# Patient Record
Sex: MALE | Race: WHITE | NOT HISPANIC OR LATINO | Employment: FULL TIME | URBAN - METROPOLITAN AREA
[De-identification: names, ages, dates, MRNs, and addresses within clinical notes are randomized per-mention and may not be internally consistent; named-entity substitution may affect disease eponyms.]

---

## 2017-12-28 ENCOUNTER — TRANSCRIBE ORDERS (OUTPATIENT)
Dept: PHYSICAL THERAPY | Facility: CLINIC | Age: 57
End: 2017-12-28

## 2017-12-28 DIAGNOSIS — S86.091A OTHER SPECIFIED INJURY OF RIGHT ACHILLES TENDON, INITIAL ENCOUNTER: Primary | ICD-10-CM

## 2017-12-28 DIAGNOSIS — Z47.89 ORTHOPEDIC AFTERCARE: ICD-10-CM

## 2018-01-02 ENCOUNTER — TREATMENT (OUTPATIENT)
Dept: PHYSICAL THERAPY | Facility: CLINIC | Age: 58
End: 2018-01-02

## 2018-01-02 DIAGNOSIS — M76.61 ACHILLES TENDINITIS OF RIGHT LOWER EXTREMITY: Primary | ICD-10-CM

## 2018-01-02 DIAGNOSIS — S86.011A ACHILLES RUPTURE, RIGHT, INITIAL ENCOUNTER: ICD-10-CM

## 2018-01-02 DIAGNOSIS — R26.2 DIFFICULTY WALKING: ICD-10-CM

## 2018-01-02 PROCEDURE — 97035 APP MDLTY 1+ULTRASOUND EA 15: CPT | Performed by: PHYSICAL THERAPIST

## 2018-01-02 PROCEDURE — 97014 ELECTRIC STIMULATION THERAPY: CPT | Performed by: PHYSICAL THERAPIST

## 2018-01-02 PROCEDURE — 97110 THERAPEUTIC EXERCISES: CPT | Performed by: PHYSICAL THERAPIST

## 2018-01-02 PROCEDURE — 97161 PT EVAL LOW COMPLEX 20 MIN: CPT | Performed by: PHYSICAL THERAPIST

## 2018-01-02 PROCEDURE — 97140 MANUAL THERAPY 1/> REGIONS: CPT | Performed by: PHYSICAL THERAPIST

## 2018-01-02 NOTE — PROGRESS NOTES
Physical Therapy Initial Evaluation and Plan of Care        Subjective Evaluation    History of Present Illness  Onset date: 2017.  Mechanism of injury: During a home move, he was going up and down ramps with boxes. Strained the heal area, started noticing pain.  He came under the care of Dr Villar in 2017, MRI performed, partial rupture of the right achilles tendon.  He was booted 4 weeks, then casted 3 weeks, then booted 6 weeks.  He has been out of the boot about 1 month.  Has been doing PT at [facility], 8 times. Feels it was helping.    Complaints:  The right achilles/ankle is very stiff in morning and after prolong sitting. Still has some discomfort in the right achilles.  No paraesthesia or anaesthesia. Feels better in his work boot.    MEDICATION: 81 mg Asprin daily      Patient Occupation: , [employer]  Current pain ratin  At best pain ratin  At worst pain ratin    Hand dominance: right    Diagnostic Tests  MRI studies: abnormal (Partial Achilles tendon rupture.)             Objective       Static Posture     Ankle/Foot   Ankle/Foot (Left): Pes cavus.   Ankle/Foot (Right): Pes cavus.     Observations     Right Ankle/Foot   Positive for deformity.     Additional Observation Details  Large knot on the achilles tendon about 2 cm proximal to the calcaneous     Tenderness     Additional Tenderness Details  Right achilles tendon mid substance tender.    Neurological Testing   Sensation     Ankle/Foot     Right Ankle/Foot   Intact: light touch, pin prick and sharp/dull discrimination     Passive Range of Motion     Right Ankle/Foot    Dorsiflexion (kf): 2 degrees    Plantar flexion: 55 degrees   Inversion: 22 degrees   Eversion: 24 degrees     Strength/Myotome Testing     Right Ankle/Foot   Dorsiflexion: 3+  Plantar flexion: 5  Inversion: 5  Eversion: 5  Great toe flexion: 5  Great toe extension: 5    Swelling   Left Ankle/Foot   Malleoli: 24 cm    Right Ankle/Foot   Malleoli: 26  cm    Ambulation   Weight-Bearing Status   Assistive device used: none    Ambulation: Level Surfaces   Ambulation without assistive device: independent    Ambulation: Stairs   Ascend stairs: independent  Descend stairs: independent    Observational Gait   Walking speed within functional limits. Decreased stride length, left step length and right step length.   Left foot contact pattern: toe to heel  Right foot contact pattern: toe to heel         Assessment & Plan     Assessment  Impairments: abnormal or restricted ROM, activity intolerance, impaired balance, impaired physical strength and pain with function  Assessment details: This pt has a history of a partial achilles right tendon rupture, which has led to chronic achilles tendinitis.  His neurological exam is normal today.  Problems: Decreased ROM, decreased strength, difficultly walking, pain.  Prognosis: good  Prognosis details:   GOALS  Goals to be met in 6 weeks.  1. Pt is independent with HEP.  2. Pt has 15-20 degrees of right ankle dorsi-flexion.  3. Pt is able to climb hills with tolerable discomfort.  4. Pt LE strength is 5/5 for the right.  Functional Limitations: carrying objects, lifting, walking and standing  Plan  Therapy options: will be seen for skilled physical therapy services  Planned modality interventions: cryotherapy, high voltage pulsed current (pain management), ultrasound and iontophoresis  Planned therapy interventions: manual therapy, neuromuscular re-education, postural training, soft tissue mobilization, strengthening, stretching, home exercise program, functional ROM exercises, balance/weight-bearing training, flexibility and gait training  Frequency: 2x week  Duration in weeks: 6        Manual Therapy:    15     mins  29232;  Therapeutic Exercise:    10     mins  33212;     Neuromuscular Yolande:        mins  18142;    Therapeutic Activity:          mins  54884;     Gait Training:           mins  01683;     Ultrasound:     13     mins   00041;    Electrical Stimulation:    20     mins  05358 ( );  Dry Needling          mins self-pay    Timed Treatment:   38   mins   Total Treatment:     80   mins    PT SIGNATURE: Ray Ramirez, PT   DATE TREATMENT INITIATED: 1/2/2018    Initial Certification  Certification Period: 4/2/2018  I certify that the therapy services are furnished while this patient is under my care.  The services outlined above are required by this patient, and will be reviewed every 90 days.     PHYSICIAN: Jace Villar DPM      DATE:     Please sign and return via fax to [phone].. Thank you, [facility].

## 2018-01-09 ENCOUNTER — TREATMENT (OUTPATIENT)
Dept: PHYSICAL THERAPY | Facility: CLINIC | Age: 58
End: 2018-01-09

## 2018-01-09 DIAGNOSIS — R26.2 DIFFICULTY WALKING: ICD-10-CM

## 2018-01-09 DIAGNOSIS — M76.61 ACHILLES TENDINITIS OF RIGHT LOWER EXTREMITY: Primary | ICD-10-CM

## 2018-01-09 DIAGNOSIS — S86.011S ACHILLES TENDON RUPTURE, RIGHT, SEQUELA: ICD-10-CM

## 2018-01-09 PROCEDURE — 97110 THERAPEUTIC EXERCISES: CPT | Performed by: PHYSICAL THERAPIST

## 2018-01-09 PROCEDURE — 97014 ELECTRIC STIMULATION THERAPY: CPT | Performed by: PHYSICAL THERAPIST

## 2018-01-09 PROCEDURE — 97035 APP MDLTY 1+ULTRASOUND EA 15: CPT | Performed by: PHYSICAL THERAPIST

## 2018-01-09 PROCEDURE — 97140 MANUAL THERAPY 1/> REGIONS: CPT | Performed by: PHYSICAL THERAPIST

## 2018-01-12 ENCOUNTER — TREATMENT (OUTPATIENT)
Dept: PHYSICAL THERAPY | Facility: CLINIC | Age: 58
End: 2018-01-12

## 2018-01-12 DIAGNOSIS — M76.61 ACHILLES TENDINITIS OF RIGHT LOWER EXTREMITY: Primary | ICD-10-CM

## 2018-01-12 DIAGNOSIS — R26.2 DIFFICULTY WALKING: ICD-10-CM

## 2018-01-12 DIAGNOSIS — S86.011S ACHILLES TENDON RUPTURE, RIGHT, SEQUELA: ICD-10-CM

## 2018-01-12 PROCEDURE — 97110 THERAPEUTIC EXERCISES: CPT | Performed by: PHYSICAL THERAPIST

## 2018-01-12 PROCEDURE — 97140 MANUAL THERAPY 1/> REGIONS: CPT | Performed by: PHYSICAL THERAPIST

## 2018-01-12 PROCEDURE — 97035 APP MDLTY 1+ULTRASOUND EA 15: CPT | Performed by: PHYSICAL THERAPIST

## 2018-01-12 PROCEDURE — 97014 ELECTRIC STIMULATION THERAPY: CPT | Performed by: PHYSICAL THERAPIST

## 2018-01-12 NOTE — PROGRESS NOTES
Subjective   Corky Rodriguez reports: the last treatment seem to help some.  Not as tender.    Objective   OBSERVATION:  Less edema at achilles tendon  PALPATION: Knot on achilles is not as tender  GAIT: Good heel strike today.  See Exercise, Manual, and Modality Logs for complete treatment.       Assessment/Plan  The addition of modalities to his treatment was helpful.  Progress per Plan of Care           Manual Therapy:    15     mins  40811;  Therapeutic Exercise:    8     mins  62518;     Neuromuscular Yolande:        mins  23631;    Therapeutic Activity:          mins  05232;     Gait Training:           mins  23003;     Ultrasound:     15     mins  81233;   Iontophoresis          mins  68495   Electrical Stimulation:    20     mins  63917 ( );  Dry Needling          mins self-pay  Fluidotherapy          mins 91696    Timed Treatment:   38   mins   Total Treatment:     53   mins    Ray Ramirez, PT  Physical Therapist

## 2018-01-15 NOTE — PROGRESS NOTES
Subjective   Corky Rodriguez reports: PT seems to be helping    Objective   OBSERVATION:  Less edema at achilles tendon  PALPATION: Knot on achilles is not as tender or as large  GAIT: Good heel strike today.  See Exercise, Manual, and Modality Logs for complete treatment.       Assessment/Plan  S/P right achilles tendon partial rupture, improving with PT  Progress per Plan of Care           Manual Therapy:    16     mins  50494;  Therapeutic Exercise:    15     mins  50466;     Neuromuscular Yolande:        mins  29291;    Therapeutic Activity:          mins  47222;     Gait Training:           mins  98329;     Ultrasound:     15     mins  27799;   Iontophoresis          mins  79986   Electrical Stimulation:    20     mins  07103 ( );  Dry Needling          mins self-pay  Fluidotherapy          mins 01972    Timed Treatment:   46   mins   Total Treatment:     66   mins    Ray Ramirez, PT  Physical Therapist

## 2018-01-16 ENCOUNTER — TREATMENT (OUTPATIENT)
Dept: PHYSICAL THERAPY | Facility: CLINIC | Age: 58
End: 2018-01-16

## 2018-01-16 DIAGNOSIS — S86.011S ACHILLES TENDON RUPTURE, RIGHT, SEQUELA: ICD-10-CM

## 2018-01-16 DIAGNOSIS — R26.2 DIFFICULTY WALKING: ICD-10-CM

## 2018-01-16 DIAGNOSIS — M76.61 ACHILLES TENDINITIS OF RIGHT LOWER EXTREMITY: Primary | ICD-10-CM

## 2018-01-16 PROCEDURE — 97140 MANUAL THERAPY 1/> REGIONS: CPT | Performed by: PHYSICAL THERAPIST

## 2018-01-16 PROCEDURE — 97035 APP MDLTY 1+ULTRASOUND EA 15: CPT | Performed by: PHYSICAL THERAPIST

## 2018-01-16 PROCEDURE — 97110 THERAPEUTIC EXERCISES: CPT | Performed by: PHYSICAL THERAPIST

## 2018-01-16 PROCEDURE — 97014 ELECTRIC STIMULATION THERAPY: CPT | Performed by: PHYSICAL THERAPIST

## 2018-01-16 NOTE — PROGRESS NOTES
Physical Therapy Daily Progress Note        Patient: Corky Rodriguez   : 1960  Diagnosis/ICD-10 Code:  Achilles tendinitis of right lower extremity [M76.61]  Referring practitioner: Jace Villar DPM  Date of Initial Visit: Type: THERAPY  Noted: 2018  Today's Date: 2018  Patient seen for 4 sessions           Subjective   Corky Rodriguez reports: Still sore, but feels like he is improving    Objective   GAIT:  Good push off most of the time ambulating today.  OBSERVATION: Lauren still present posterior mid achilles on the right.  PALPATION:  Fibrosis noted throughout the calf.  See Exercise, Manual, and Modality Logs for complete treatment.       Assessment/Plan  Pt good effort from the pt.  The calf has some muscular restrictions present. Tolerating more activities  Progress strengthening /stabilization /functional activity           Manual Therapy:    14     mins  10524;  Therapeutic Exercise:    25     mins  79091;     Neuromuscular Yolande:        mins  52633;    Therapeutic Activity:          mins  98780;     Gait Training:           mins  77088;     Ultrasound:     15     mins  07611;    Electrical Stimulation:    20     mins  49323 ( );  Fluidotherapy:          mins  90764  Traction:          mins  83923  Dry Needling          mins self-pay    Timed Treatment:   54   mins   Total Treatment:     74   mins    Ray Ramirez, PT  Physical Therapist

## 2018-01-19 ENCOUNTER — TREATMENT (OUTPATIENT)
Dept: PHYSICAL THERAPY | Facility: CLINIC | Age: 58
End: 2018-01-19

## 2018-01-19 DIAGNOSIS — S86.011S ACHILLES TENDON RUPTURE, RIGHT, SEQUELA: ICD-10-CM

## 2018-01-19 DIAGNOSIS — R26.2 DIFFICULTY WALKING: ICD-10-CM

## 2018-01-19 DIAGNOSIS — M76.61 ACHILLES TENDINITIS OF RIGHT LOWER EXTREMITY: Primary | ICD-10-CM

## 2018-01-19 PROCEDURE — 97035 APP MDLTY 1+ULTRASOUND EA 15: CPT | Performed by: PHYSICAL THERAPIST

## 2018-01-19 PROCEDURE — 97014 ELECTRIC STIMULATION THERAPY: CPT | Performed by: PHYSICAL THERAPIST

## 2018-01-19 PROCEDURE — 97110 THERAPEUTIC EXERCISES: CPT | Performed by: PHYSICAL THERAPIST

## 2018-01-22 NOTE — PROGRESS NOTES
Physical Therapy Daily Progress Note        Patient: Corky Rodriguez   : 1960  Diagnosis/ICD-10 Code:  Achilles tendinitis of right lower extremity [M76.61]  Referring practitioner: Jace Villar DPM  Date of Initial Visit: Type: THERAPY  Noted: 2018  Today's Date: 2018  Patient seen for 5 sessions           Subjective   Corky Rodriguez reports: Still sore, but feels like he is improving    Objective   GAIT:  Good push off most of the time ambulating today.  OBSERVATION: Lauren still present posterior mid achilles on the right., but smaller  ROM:  Ankle dorsiflexion passive is WFL, active still limited  See Exercise, Manual, and Modality Logs for complete treatment.       Assessment/Plan  Pt good effort from the pt.  Continues to improve with achilles partial tear.  Progress strengthening /stabilization /functional activity           Manual Therapy:         mins  09571;  Therapeutic Exercise:    35     mins  92884;     Neuromuscular Yolande:        mins  97731;    Therapeutic Activity:          mins  82504;     Gait Training:           mins  14247;     Ultrasound:     15     mins  18186;    Electrical Stimulation:    20     mins  19715 ( );  Fluidotherapy:          mins  77552  Traction:          mins  83555  Dry Needling          mins self-pay    Timed Treatment:   50   mins   Total Treatment:     70   mins    Ray Ramirez, PT  Physical Therapist

## 2018-01-23 ENCOUNTER — TREATMENT (OUTPATIENT)
Dept: PHYSICAL THERAPY | Facility: CLINIC | Age: 58
End: 2018-01-23

## 2018-01-23 DIAGNOSIS — M76.61 ACHILLES TENDINITIS OF RIGHT LOWER EXTREMITY: Primary | ICD-10-CM

## 2018-01-23 DIAGNOSIS — R26.2 DIFFICULTY WALKING: ICD-10-CM

## 2018-01-23 DIAGNOSIS — S86.011S ACHILLES TENDON RUPTURE, RIGHT, SEQUELA: ICD-10-CM

## 2018-01-23 PROCEDURE — 97014 ELECTRIC STIMULATION THERAPY: CPT | Performed by: PHYSICAL THERAPIST

## 2018-01-23 PROCEDURE — 97110 THERAPEUTIC EXERCISES: CPT | Performed by: PHYSICAL THERAPIST

## 2018-01-23 PROCEDURE — 97035 APP MDLTY 1+ULTRASOUND EA 15: CPT | Performed by: PHYSICAL THERAPIST

## 2018-01-24 NOTE — PROGRESS NOTES
Physical Therapy Daily Progress Note        Patient: Corky Rodriguez   : 1960  Diagnosis/ICD-10 Code:  Achilles tendinitis of right lower extremity [M76.61]  Referring practitioner: Jace Villar DPM  Date of Initial Visit: Type: THERAPY  Noted: 2018  Today's Date: 2018  Patient seen for 6 sessions           Subjective   Corky Rodriguez reports: a little sore, but feels a lot better    Objective   GAIT:  Good push off most of the time ambulating today.  Normal step length  PALPATION:  Tender at medial achilles, but less severe.  ROM:  Ankle dorsiflexion passive is WFL, active still limited  See Exercise, Manual, and Modality Logs for complete treatment.       Assessment/Plan   Continues to improve with achilles partial tear, getting close to discharge.  Modalities helpful.  Progress strengthening /stabilization /functional activity           Manual Therapy:         mins  91509;  Therapeutic Exercise:    45     mins  61457;     Neuromuscular Yolande:        mins  25482;    Therapeutic Activity:          mins  30433;     Gait Training:           mins  20161;     Ultrasound:     15     mins  80750;    Electrical Stimulation:    20     mins  57581 ( );  Fluidotherapy:          mins  57904  Traction:          mins  75991  Dry Needling          mins self-pay    Timed Treatment:   60   mins   Total Treatment:     80   mins    Ray Ramirez PT  Physical Therapist

## 2018-01-26 ENCOUNTER — TREATMENT (OUTPATIENT)
Dept: PHYSICAL THERAPY | Facility: CLINIC | Age: 58
End: 2018-01-26

## 2018-01-26 DIAGNOSIS — R26.2 DIFFICULTY WALKING: ICD-10-CM

## 2018-01-26 DIAGNOSIS — M76.61 ACHILLES TENDINITIS OF RIGHT LOWER EXTREMITY: Primary | ICD-10-CM

## 2018-01-26 DIAGNOSIS — S86.011S ACHILLES TENDON RUPTURE, RIGHT, SEQUELA: ICD-10-CM

## 2018-01-26 PROCEDURE — 97110 THERAPEUTIC EXERCISES: CPT | Performed by: PHYSICAL THERAPIST

## 2018-01-26 PROCEDURE — 97035 APP MDLTY 1+ULTRASOUND EA 15: CPT | Performed by: PHYSICAL THERAPIST

## 2018-01-26 PROCEDURE — 97014 ELECTRIC STIMULATION THERAPY: CPT | Performed by: PHYSICAL THERAPIST

## 2018-01-26 NOTE — PROGRESS NOTES
Physical Therapy Daily Progress Note        Patient: Corky Rodriguez   : 1960  Diagnosis/ICD-10 Code:  Achilles tendinitis of right lower extremity [M76.61]  Referring practitioner: Jace Villar DPM  Date of Initial Visit: Type: THERAPY  Noted: 2018  Today's Date: 2018  Patient seen for 7 sessions           Subjective   Corky Rodriguez reports: Pt reports he is 70% improved, able to walk normal and tolerating ladders and hills now.    Objective   SEE LETTER TO MD IN MEDIA  See Exercise, Manual, and Modality Logs for complete treatment.       Assessment/Plan  SEE LETTER TO MD IN MEDIA  Awaiting MD orders           Manual Therapy:         mins  83104;  Therapeutic Exercise:    45     mins  88644;     Neuromuscular Yolande:        mins  96756;    Therapeutic Activity:          mins  82680;     Gait Training:           mins  71793;     Ultrasound:     15     mins  97049;    Electrical Stimulation:   20      mins  66560 ( );  Iontophoresis          mins 51679   Traction          mins  05444  Fluidotherapy          mins  04757  Dry Needling          mins self-pay  Traction          mins  81034    Timed Treatment:   60   mins   Total Treatment:     80   mins    Ray Ramirez, PT  Physical Therapist